# Patient Record
Sex: MALE | Race: AMERICAN INDIAN OR ALASKA NATIVE
[De-identification: names, ages, dates, MRNs, and addresses within clinical notes are randomized per-mention and may not be internally consistent; named-entity substitution may affect disease eponyms.]

---

## 2020-04-05 ENCOUNTER — HOSPITAL ENCOUNTER (EMERGENCY)
Dept: HOSPITAL 5 - ED | Age: 70
Discharge: HOME | End: 2020-04-05
Payer: MEDICARE

## 2020-04-05 VITALS — DIASTOLIC BLOOD PRESSURE: 95 MMHG | SYSTOLIC BLOOD PRESSURE: 170 MMHG

## 2020-04-05 DIAGNOSIS — Z85.46: ICD-10-CM

## 2020-04-05 DIAGNOSIS — K59.00: Primary | ICD-10-CM

## 2020-04-05 DIAGNOSIS — I10: ICD-10-CM

## 2020-04-05 DIAGNOSIS — Z85.3: ICD-10-CM

## 2020-04-05 DIAGNOSIS — Z87.891: ICD-10-CM

## 2020-04-05 LAB
ALBUMIN SERPL-MCNC: 4.1 G/DL (ref 3.9–5)
ALT SERPL-CCNC: 12 UNITS/L (ref 7–56)
BASOPHILS # (AUTO): 0 K/MM3 (ref 0–0.1)
BASOPHILS NFR BLD AUTO: 0.7 % (ref 0–1.8)
BUN SERPL-MCNC: 25 MG/DL (ref 9–20)
BUN/CREAT SERPL: 15 %
CALCIUM SERPL-MCNC: 9 MG/DL (ref 8.4–10.2)
EOSINOPHIL # BLD AUTO: 0.1 K/MM3 (ref 0–0.4)
EOSINOPHIL NFR BLD AUTO: 2 % (ref 0–4.3)
HCT VFR BLD CALC: 34.9 % (ref 35.5–45.6)
HEMOLYSIS INDEX: 7
HGB BLD-MCNC: 11.9 GM/DL (ref 11.8–15.2)
LYMPHOCYTES # BLD AUTO: 1.5 K/MM3 (ref 1.2–5.4)
LYMPHOCYTES NFR BLD AUTO: 22.7 % (ref 13.4–35)
MCHC RBC AUTO-ENTMCNC: 34 % (ref 32–34)
MCV RBC AUTO: 93 FL (ref 84–94)
MONOCYTES # (AUTO): 0.6 K/MM3 (ref 0–0.8)
MONOCYTES % (AUTO): 8.8 % (ref 0–7.3)
PLATELET # BLD: 220 K/MM3 (ref 140–440)
RBC # BLD AUTO: 3.75 M/MM3 (ref 3.65–5.03)

## 2020-04-05 PROCEDURE — 80053 COMPREHEN METABOLIC PANEL: CPT

## 2020-04-05 PROCEDURE — 74019 RADEX ABDOMEN 2 VIEWS: CPT

## 2020-04-05 PROCEDURE — 85025 COMPLETE CBC W/AUTO DIFF WBC: CPT

## 2020-04-05 PROCEDURE — 83690 ASSAY OF LIPASE: CPT

## 2020-04-05 PROCEDURE — 36415 COLL VENOUS BLD VENIPUNCTURE: CPT

## 2020-04-05 NOTE — EMERGENCY DEPARTMENT REPORT
HPI





- General


Chief Complaint: Abdominal Pain


Time Seen by Provider: 20 06:13





- HPI


HPI: 





Room 3








The patient is a 69-year-old male present with a chief complaint of 

constipation.  Patient states she has been constipated since midnight.  Patient 

states she has abdominal pain/fullness feeling as though he has to have a bowel 

movement but he is been unable to pass stool.  Patient states his last bowel 

movement occurred 4/3/2020 and was within normal limits.  Patient denies nausea 

or vomiting.





ED Past Medical Hx





- Past Medical History


Hx Hypertension: Yes


Hx of Cancer: Yes (bone and prostate s/p xrt ~)





- Surgical History


Past Surgical History?: Yes


Additional Surgical History: foot, prostate





- Family History


Family history: no significant





- Social History


Smoking Status: Former Smoker (None since )


Substance Use Type: None (Denies illicit drug use), Alcohol (Occasional)





- Medications


Home Medications: 


                                Home Medications











 Medication  Instructions  Recorded  Confirmed  Last Taken  Type


 


Docusate Sodium [Colace] 100 mg PO BID PRN #60 capsule 20  Unknown Rx


 


[id number]/Sod Sulf,Bicarb,Cl/KCl 4,000 ml PO ONCE #4000 ml 20  Unknown Rx





[Golytely Solution]     














ED Review of Systems


ROS: 


Stated complaint: CONSTIPATION


Other details as noted in HPI





Constitutional: no symptoms reported


Eyes: denies: eye pain


ENT: denies: throat pain


Respiratory: no symptoms reported


Cardiovascular: denies: chest pain


Endocrine: no symptoms reported


Gastrointestinal: abdominal pain, constipation.  denies: nausea, vomiting


Musculoskeletal: denies: back pain


Neurological: denies: headache





Physical Exam





- Physical Exam


Vital Signs: 


                                   Vital Signs











  20





  03:27


 


Temperature 98.0 F


 


Pulse Rate 51 L


 


Respiratory 18





Rate 


 


Blood Pressure 121/54





[Right] 


 


O2 Sat by Pulse 99





Oximetry 











Physical Exam: 





GENERAL: The patient is well-developed well-nourished male lying on stretcher 

not appearing to be in acute distress. []


HEENT: Normocephalic.  Atraumatic.  Extraocular motions are intact.  Patient has

 moist mucous membranes.


NECK: Supple.  Trachea midline


CHEST/LUNGS: There is no respiratory distress noted.


HEART/CARDIOVASCULAR: Regular.  There is no tachycardia.  There is no gallop rub

 or murmur.


ABDOMEN: Abdomen is soft, nontender.  Patient has normal bowel sounds.  There is

 no abdominal distention.


SKIN: There is no rash.  There is no edema.  There is no diaphoresis.


NEURO: The patient is awake, alert, and oriented.  The patient is cooperative.  

The patient has normal speech 


MUSCULOSKELETAL:  There is no evidence of acute injury.


RECTAL: Fecal impaction present.  To no evidence of stool removed.  Patient did 

not tolerate well and procedure had to be aborted





ED Course


                                   Vital Signs











  20





  03:27


 


Temperature 98.0 F


 


Pulse Rate 51 L


 


Respiratory 18





Rate 


 


Blood Pressure 121/54





[Right] 


 


O2 Sat by Pulse 99





Oximetry 














- Reevaluation(s)


Reevaluation #1: 





20 10:04


Patient states he passed a small amount of stool and feels slightly improved.





ED Medical Decision Making





- Lab Data


Result diagrams: 


                                 20 03:37





                                 20 03:37





- Radiology Data


Radiology results: report reviewed (Abdominal x-ray), image reviewed (Abdominal 

x-ray)


interpreted by me: 





Abdominal x-ray-no air-fluid levels seen





Findings


[facility] [address] [place] [address] 

XRay Report Signed Patient: DOMINGO SANFORD JR MR#: [id number] : 1950 

Acct:[id number] Age/Sex: 69 / M ADM Date: 20 Loc: ED Attending Dr: 

Ordering Physician: CLARA PEREZ MD Date of Service: 20 Procedure(s):

 XR abdomen 2V Accession Number(s): [id number] cc: CLARA PEREZ MD Fluoro Time

 In Minutes: SUPINE ABDOMEN 2020 INDICATION / CLINICAL INFORMATION: con

stipation. COMPARISON: None available. FINDINGS: Fecal retention is noted in the

 right colon, splenic flexure and rectosigmoid. No significant small bowel 

gaseous distention. Signer Name: Jareth Cameron MD Signed: 2020 5:25 AM 

Workstation Name: [id number] Transcribed By: [place] Dictated By: Jareth Cameron MD

 Electronically Authenticated By: Jareth Cameron MD Signed Date/Time: 20 DD/DT: 20 TD/TT: 











- Differential Diagnosis


Constipation, obstruction,


Critical care attestation.: 


If time is entered above; I have spent that time in minutes in the direct care 

of this critically ill patient, excluding procedure time.








ED Disposition


Clinical Impression: 


 Constipation





Disposition: DC-01 TO HOME OR SELFCARE


Is pt being admited?: No


Does the pt Need Aspirin: No


Condition: Stable


Instructions:  Constipation (ED)


Additional Instructions: 


Return to the emergency department should you develop worsening symptoms, 

inability to tolerate food or liquids, high fever or any other concerns


Prescriptions: 


Docusate Sodium [Colace] 100 mg PO BID PRN #60 capsule


 PRN Reason: Constipation


[id number]/Sod Sulf,Bicarb,Cl/KCl [Golytely Solution] 4,000 ml PO ONCE #4000 ml


Referrals: 


PRIMARY CARE,MD [Primary Care Provider] - 3-5 Days


VALENTINA QUINTANILLA MD [Staff Physician] - 3-5 Days (Dr. Quintanilla is a 

gastroenterologist.  Please follow-up with him for further evaluation)


Time of Disposition: 10:08

## 2020-04-05 NOTE — EVENT NOTE
Date: 04/05/20





69-year-old gentleman presenting with difficulty having a bowel movement x4 

hours.  Has no additional complaints.  Abdomen is soft and benign, with no 

rebound, guarding or peritoneal signs, and normal bowel sounds.  Has no history 

of abdominal surgeries.  He believes that he is constipated.  Denies fever, 

cough, corona virus








Laboratory studies sent prior to my personal evaluation.  Suspect constipation. 

X-ray abdomen pelvis is ordered.


                                   Vital Signs











  04/05/20





  03:27


 


Temperature 98.0 F


 


Pulse Rate 51 L


 


Respiratory 18





Rate 


 


Blood Pressure 121/54





[Right] 


 


O2 Sat by Pulse 99





Oximetry 








                                   Lab Results











  04/05/20 04/05/20 Range/Units





  03:37 03:37 


 


WBC  6.6   (4.5-11.0)  K/mm3


 


RBC  3.75   (3.65-5.03)  M/mm3


 


Hgb  11.9   (11.8-15.2)  gm/dl


 


Hct  34.9 L   (35.5-45.6)  %


 


MCV  93   (84-94)  fl


 


MCH  32   (28-32)  pg


 


MCHC  34   (32-34)  %


 


RDW  14.0   (13.2-15.2)  %


 


Plt Count  220   (140-440)  K/mm3


 


Lymph % (Auto)  22.7   (13.4-35.0)  %


 


Mono % (Auto)  8.8 H   (0.0-7.3)  %


 


Eos % (Auto)  2.0   (0.0-4.3)  %


 


Baso % (Auto)  0.7   (0.0-1.8)  %


 


Lymph #  1.5   (1.2-5.4)  K/mm3


 


Mono #  0.6   (0.0-0.8)  K/mm3


 


Eos #  0.1   (0.0-0.4)  K/mm3


 


Baso #  0.0   (0.0-0.1)  K/mm3


 


Seg Neutrophils %  65.8   (40.0-70.0)  %


 


Seg Neutrophils #  4.3   (1.8-7.7)  K/mm3


 


Sodium   137  (137-145)  mmol/L


 


Potassium   3.9  (3.6-5.0)  mmol/L


 


Chloride   102.0  ()  mmol/L


 


Carbon Dioxide   22  (22-30)  mmol/L


 


Anion Gap   17  mmol/L


 


BUN   25 H  (9-20)  mg/dL


 


Creatinine   1.7 H  (0.8-1.5)  mg/dL


 


Estimated GFR   48  ml/min


 


BUN/Creatinine Ratio   15  %


 


Glucose   141 H  ()  mg/dL


 


Calcium   9.0  (8.4-10.2)  mg/dL


 


Total Bilirubin   0.30  (0.1-1.2)  mg/dL


 


AST   18  (5-40)  units/L


 


ALT   12  (7-56)  units/L


 


Alkaline Phosphatase   84  ()  units/L


 


Total Protein   7.1  (6.3-8.2)  g/dL


 


Albumin   4.1  (3.9-5)  g/dL


 


Albumin/Globulin Ratio   1.4  %


 


Lipase   17  (13-60)  units/L

## 2020-04-05 NOTE — XRAY REPORT
SUPINE ABDOMEN

4/5/2020



INDICATION / CLINICAL INFORMATION:

constipation.



COMPARISON:

None available.



FINDINGS:

Fecal retention is noted in the right colon, splenic flexure and rectosigmoid.

No significant small bowel gaseous distention.



Signer Name: Jareth Cameron MD 

Signed: 4/5/2020 5:25 AM

Workstation Name: [id number]